# Patient Record
Sex: MALE | Race: WHITE | NOT HISPANIC OR LATINO | Employment: FULL TIME | URBAN - NONMETROPOLITAN AREA
[De-identification: names, ages, dates, MRNs, and addresses within clinical notes are randomized per-mention and may not be internally consistent; named-entity substitution may affect disease eponyms.]

---

## 2022-08-05 DIAGNOSIS — I10 PRIMARY HYPERTENSION: Primary | ICD-10-CM

## 2022-08-05 RX ORDER — LISINOPRIL AND HYDROCHLOROTHIAZIDE 25; 20 MG/1; MG/1
1 TABLET ORAL DAILY
Qty: 30 TABLET | Refills: 0 | Status: SHIPPED | OUTPATIENT
Start: 2022-08-05 | End: 2022-09-21 | Stop reason: SDUPTHER

## 2022-08-05 RX ORDER — LISINOPRIL AND HYDROCHLOROTHIAZIDE 25; 20 MG/1; MG/1
TABLET ORAL
COMMUNITY
Start: 2022-06-01 | End: 2022-08-05 | Stop reason: SDUPTHER

## 2022-08-05 NOTE — TELEPHONE ENCOUNTER
Caller: Jacoby Mcmullen    Relationship: Self    Best call back number:[phone] (H)    Requested Prescriptions:   Requested Prescriptions      No prescriptions requested or ordered in this encounter      LISINOPRIL HYDROCHLOROTHIAZIDE 25 MG TAKE ONE TABLET BY MOUTH [place]     Pharmacy where request should be sent: [facility] [address] [place] [address] - [phone] [facility] [phone] [facility]     Additional details provided by patient: PATIENT IS OUT OF MEDICATION- PATIENT IS NEEDING AT LEAST A MONTH SUPPLY- PATIENT DOES NOT HAVE TIME TO TAKE OFF FROM WORK UNTIL September     Does the patient have less than a 3 day supply:  [x] Yes  [] No    Emanuel Prasad   08/05/22 12:03 EDT

## 2022-08-05 NOTE — TELEPHONE ENCOUNTER
Rx Refill Note    Requested Prescriptions     Pending Prescriptions Disp Refills    lisinopril-hydrochlorothiazide (PRINZIDE,ZESTORETIC) 20-25 MG per tablet 30 tablet 0     Sig: Take 1 tablet by mouth Daily.        Last office visit with prescribing clinician:      Next office visit with prescribing clinician: Visit date not found   Last labs:   Last refill:    Pharmacy         Cx order pt has not been here in over a year

## 2022-08-08 ENCOUNTER — TELEPHONE (OUTPATIENT)
Dept: FAMILY MEDICINE CLINIC | Facility: CLINIC | Age: 52
End: 2022-08-08

## 2022-08-08 DIAGNOSIS — I10 PRIMARY HYPERTENSION: ICD-10-CM

## 2022-08-08 NOTE — TELEPHONE ENCOUNTER
Pt made appt for Sep. 6th, wanted to know if he could go ahead and get his Lisinopril called in now?    [phone]

## 2022-08-09 RX ORDER — LISINOPRIL AND HYDROCHLOROTHIAZIDE 25; 20 MG/1; MG/1
1 TABLET ORAL DAILY
Qty: 30 TABLET | Refills: 0 | OUTPATIENT
Start: 2022-08-09

## 2022-09-21 ENCOUNTER — OFFICE VISIT (OUTPATIENT)
Dept: FAMILY MEDICINE CLINIC | Facility: CLINIC | Age: 52
End: 2022-09-21

## 2022-09-21 VITALS
BODY MASS INDEX: 41.75 KG/M2 | HEART RATE: 73 BPM | WEIGHT: 315 LBS | OXYGEN SATURATION: 98 % | HEIGHT: 73 IN | SYSTOLIC BLOOD PRESSURE: 144 MMHG | DIASTOLIC BLOOD PRESSURE: 112 MMHG

## 2022-09-21 DIAGNOSIS — I10 PRIMARY HYPERTENSION: ICD-10-CM

## 2022-09-21 DIAGNOSIS — Z00.00 ENCOUNTER FOR PREVENTATIVE ADULT HEALTH CARE EXAMINATION: Primary | ICD-10-CM

## 2022-09-21 DIAGNOSIS — Z12.11 SCREENING FOR COLON CANCER: ICD-10-CM

## 2022-09-21 DIAGNOSIS — M25.472 LEFT ANKLE SWELLING: ICD-10-CM

## 2022-09-21 PROCEDURE — 99396 PREV VISIT EST AGE 40-64: CPT | Performed by: FAMILY MEDICINE

## 2022-09-21 RX ORDER — LISINOPRIL AND HYDROCHLOROTHIAZIDE 25; 20 MG/1; MG/1
1 TABLET ORAL DAILY
Qty: 30 TABLET | Refills: 5 | Status: SHIPPED | OUTPATIENT
Start: 2022-09-21 | End: 2022-10-24 | Stop reason: SDUPTHER

## 2022-09-21 RX ORDER — DOXYCYCLINE HYCLATE 100 MG
TABLET ORAL
COMMUNITY
Start: 2022-07-20 | End: 2022-09-21

## 2022-09-21 RX ORDER — BROMPHENIRAMINE MALEATE, PSEUDOEPHEDRINE HYDROCHLORIDE, AND DEXTROMETHORPHAN HYDROBROMIDE 2; 30; 10 MG/5ML; MG/5ML; MG/5ML
SYRUP ORAL
COMMUNITY
Start: 2022-07-20 | End: 2022-09-21

## 2022-09-21 NOTE — PROGRESS NOTES
"Chief Complaint  Annual Exam    Subjective          Jacoby Mcmullen presents to [facility] for preventative yearly exam.   History of Present Illness  Patient is a 52-year-old male presents for preventative examination    Specifically needs his blood pressure medication refill doing well on this    His main concern is some intermittent left ankle swelling he does endorse left ankle injury a few years ago does not endorse any type of calf pain or swelling.  No joint discomfort.  Hypertension  This is a recurrent problem. The current episode started in the past 7 days. The problem has been waxing and waning since onset. The problem is controlled. Associated symptoms include anxiety and peripheral edema. Pertinent negatives include no blurred vision, chest pain, headaches, malaise/fatigue, neck pain, orthopnea, palpitations, PND, shortness of breath or sweats. There are no associated agents to hypertension. Risk factors for coronary artery disease include dyslipidemia, obesity and stress. Compliance problems include diet and exercise.        Objective   Vital Signs:   BP (!) 144/112   Pulse 73   Ht 185.4 cm (73\")   Wt (!) 168 kg (370 lb 14.4 oz)   SpO2 98%   BMI 48.93 kg/m²     Body mass index is 48.93 kg/m².    Predictive Model Details   No score data available for Risk of Fall        PHQ-9 Depression Screening  Little interest or pleasure in doing things?     Feeling down, depressed, or hopeless?     Trouble falling or staying asleep, or sleeping too much?     Feeling tired or having little energy?     Poor appetite or overeating?     Feeling bad about yourself - or that you are a failure or have let yourself or your family down?     Trouble concentrating on things, such as reading the newspaper or watching television?     Moving or speaking so slowly that other people could have noticed? Or the opposite - being so fidgety or restless that you have been moving around a lot more than " usual?     Thoughts that you would be better off dead, or of hurting yourself in some way?     PHQ-9 Total Score     If you checked off any problems, how difficult have these problems made it for you to do your work, take care of things at home, or get along with other people?       Immunization History   Administered Date(s) Administered   • COVID-19 (PFIZER) PURPLE CAP 05/17/2021, 07/02/2021       Review of Systems   Constitutional: Negative for malaise/fatigue.   Eyes: Negative for blurred vision.   Respiratory: Negative for shortness of breath.    Cardiovascular: Negative for chest pain, palpitations, orthopnea and PND.   Musculoskeletal: Negative for neck pain.       Past History:  Medical History: has a past medical history of Essential (primary) hypertension, Forearm fracture (2002), and Obesity.   Surgical History: has a past surgical history that includes ORIF forearm fracture (2002).   Family History: family history includes Alcohol abuse in his father; Breast cancer in his mother; Depression in his mother; Heart attack in his father; Hyperlipidemia in his father and mother; Hypertension in his father and mother; Obesity in his mother; Peripheral vascular disease in his mother.   Social History: reports that he has never smoked. He has never used smokeless tobacco. He reports current alcohol use of about 1.0 standard drink of alcohol per week. He reports that he does not use drugs.      Current Outpatient Medications:   •  lisinopril-hydrochlorothiazide (PRINZIDE,ZESTORETIC) 20-25 MG per tablet, Take 1 tablet by mouth Daily., Disp: 30 tablet, Rfl: 5  •  ProAir  (90 Base) MCG/ACT inhaler, , Disp: , Rfl:     Allergies: Patient has no known allergies.    Physical Exam     Result Review :              Counseling/anticipatory guidance:  Patient has been counseled on nutrition, Family planning/contraception, physical activity, health and weight, injury prevention, misuse of tobacco, alcohol and drugs,  sexual behavior, dental health, mental health, immunizations and screening.  Patient has been given counseling and guidance on the importance of breast cancer and self breast exams.     Assessment and Plan    Diagnoses and all orders for this visit:    1. Encounter for preventative adult health care examination (Primary)  -     Hepatitis C Antibody; Future  -     Hemoglobin A1c; Future  -     Comprehensive Metabolic Panel; Future  -     CK; Future  -     CBC Auto Differential; Future  -     TSH; Future  -     T4, Free; Future  -     Lipid Panel; Future  -     Hepatitis C Antibody  -     Hemoglobin A1c  -     Comprehensive Metabolic Panel  -     CK  -     CBC Auto Differential  -     TSH  -     T4, Free  -     Lipid Panel  Blood work follow-up    2. Primary hypertension  -     lisinopril-hydrochlorothiazide (PRINZIDE,ZESTORETIC) 20-25 MG per tablet; Take 1 tablet by mouth Daily.  Dispense: 30 tablet; Refill: 5  Stable continue meds and monitoring    3. Left ankle swelling  Suspected to be intermittent left ankle swelling from previous injury no calf pain monitor recommended compression stockings no suspicion at this time anything negative DVT return precautions provided    4. Screening for colon cancer  -     Ambulatory Referral For Screening Colonoscopy  Screening colonoscopy      MEDICATION SIDE EFFECTS, RISKS AND SIDE EFFECTS HAVE BEEN DISCUSSED WITH PATIENT. PATIENT NOTES UNDERSTANDING. IF ANY CONCERN OR QUESTION REGARDING MEDICATION PLEASE CONTACT.       Follow Up   No follow-ups on file.  Patient was given instructions and counseling regarding his condition or for health maintenance advice. Please see specific information pulled into the AVS if appropriate.     Alissa Arevalo DO  Answers for HPI/ROS submitted by the patient on 9/20/2022  What is the primary reason for your visit?: High Blood Pressure

## 2022-09-22 LAB
ALBUMIN SERPL-MCNC: 4.5 G/DL (ref 3.8–4.9)
ALBUMIN/GLOB SERPL: 1.6 {RATIO} (ref 1.2–2.2)
ALP SERPL-CCNC: 95 IU/L (ref 44–121)
ALT SERPL-CCNC: 24 IU/L (ref 0–44)
AST SERPL-CCNC: 23 IU/L (ref 0–40)
BASOPHILS # BLD AUTO: 0.1 X10E3/UL (ref 0–0.2)
BASOPHILS NFR BLD AUTO: 1 %
BILIRUB SERPL-MCNC: 0.5 MG/DL (ref 0–1.2)
BUN SERPL-MCNC: 13 MG/DL (ref 6–24)
BUN/CREAT SERPL: 13 (ref 9–20)
CALCIUM SERPL-MCNC: 9.9 MG/DL (ref 8.7–10.2)
CHLORIDE SERPL-SCNC: 96 MMOL/L (ref 96–106)
CHOLEST SERPL-MCNC: 260 MG/DL (ref 100–199)
CK SERPL-CCNC: 130 U/L (ref 41–331)
CO2 SERPL-SCNC: 28 MMOL/L (ref 20–29)
CREAT SERPL-MCNC: 0.98 MG/DL (ref 0.76–1.27)
EGFRCR SERPLBLD CKD-EPI 2021: 93 ML/MIN/1.73
EOSINOPHIL # BLD AUTO: 0.3 X10E3/UL (ref 0–0.4)
EOSINOPHIL NFR BLD AUTO: 2 %
ERYTHROCYTE [DISTWIDTH] IN BLOOD BY AUTOMATED COUNT: 13 % (ref 11.6–15.4)
GLOBULIN SER CALC-MCNC: 2.9 G/DL (ref 1.5–4.5)
GLUCOSE SERPL-MCNC: 81 MG/DL (ref 65–99)
HBA1C MFR BLD: 5.9 % (ref 4.8–5.6)
HCT VFR BLD AUTO: 49 % (ref 37.5–51)
HCV AB S/CO SERPL IA: <0.1 S/CO RATIO (ref 0–0.9)
HDLC SERPL-MCNC: 73 MG/DL
HGB BLD-MCNC: 15.8 G/DL (ref 13–17.7)
IMM GRANULOCYTES # BLD AUTO: 0.1 X10E3/UL (ref 0–0.1)
IMM GRANULOCYTES NFR BLD AUTO: 1 %
LDLC SERPL CALC-MCNC: 159 MG/DL (ref 0–99)
LYMPHOCYTES # BLD AUTO: 3.3 X10E3/UL (ref 0.7–3.1)
LYMPHOCYTES NFR BLD AUTO: 28 %
MCH RBC QN AUTO: 28.6 PG (ref 26.6–33)
MCHC RBC AUTO-ENTMCNC: 32.2 G/DL (ref 31.5–35.7)
MCV RBC AUTO: 89 FL (ref 79–97)
MONOCYTES # BLD AUTO: 1 X10E3/UL (ref 0.1–0.9)
MONOCYTES NFR BLD AUTO: 8 %
NEUTROPHILS # BLD AUTO: 7.1 X10E3/UL (ref 1.4–7)
NEUTROPHILS NFR BLD AUTO: 60 %
PLATELET # BLD AUTO: 328 X10E3/UL (ref 150–450)
POTASSIUM SERPL-SCNC: 4.4 MMOL/L (ref 3.5–5.2)
PROT SERPL-MCNC: 7.4 G/DL (ref 6–8.5)
RBC # BLD AUTO: 5.53 X10E6/UL (ref 4.14–5.8)
SODIUM SERPL-SCNC: 141 MMOL/L (ref 134–144)
T4 FREE SERPL-MCNC: 1.1 NG/DL (ref 0.82–1.77)
TRIGL SERPL-MCNC: 159 MG/DL (ref 0–149)
TSH SERPL DL<=0.005 MIU/L-ACNC: 4.2 UIU/ML (ref 0.45–4.5)
VLDLC SERPL CALC-MCNC: 28 MG/DL (ref 5–40)
WBC # BLD AUTO: 11.7 X10E3/UL (ref 3.4–10.8)

## 2022-10-24 ENCOUNTER — OFFICE VISIT (OUTPATIENT)
Dept: FAMILY MEDICINE CLINIC | Facility: CLINIC | Age: 52
End: 2022-10-24

## 2022-10-24 VITALS
DIASTOLIC BLOOD PRESSURE: 100 MMHG | WEIGHT: 315 LBS | BODY MASS INDEX: 41.75 KG/M2 | HEART RATE: 100 BPM | SYSTOLIC BLOOD PRESSURE: 155 MMHG | HEIGHT: 73 IN | OXYGEN SATURATION: 98 %

## 2022-10-24 DIAGNOSIS — E78.2 MIXED HYPERLIPIDEMIA: ICD-10-CM

## 2022-10-24 DIAGNOSIS — D72.829 LEUKOCYTOSIS, UNSPECIFIED TYPE: ICD-10-CM

## 2022-10-24 DIAGNOSIS — I10 PRIMARY HYPERTENSION: Primary | ICD-10-CM

## 2022-10-24 PROCEDURE — 99214 OFFICE O/P EST MOD 30 MIN: CPT | Performed by: FAMILY MEDICINE

## 2022-10-24 RX ORDER — ATORVASTATIN CALCIUM 40 MG/1
40 TABLET, FILM COATED ORAL DAILY
Qty: 90 TABLET | Refills: 3 | Status: SHIPPED | OUTPATIENT
Start: 2022-10-24

## 2022-10-24 RX ORDER — LISINOPRIL AND HYDROCHLOROTHIAZIDE 25; 20 MG/1; MG/1
1 TABLET ORAL 2 TIMES DAILY
Qty: 60 TABLET | Refills: 5 | Status: SHIPPED | OUTPATIENT
Start: 2022-10-24

## 2022-10-24 NOTE — PROGRESS NOTES
"Chief Complaint  Follow-up    Subjective          Jacoby Mcmullen presents to [facility]  History of Present Illness  Patient is a 52-year-old male who presents for blood work follow-up.  He does endorse that his blood pressure has still been running high at home despite being on his current antihypertensive medication no chest pain shortness of breath      Objective   Vital Signs:   /100   Pulse 100   Ht 185.4 cm (72.99\")   Wt (!) 169 kg (372 lb 1.6 oz)   SpO2 98%   BMI 49.10 kg/m²     Body mass index is 49.1 kg/m².    Review of Systems   Constitutional: Negative.    HENT: Negative.    Eyes: Negative.    Respiratory: Negative.    Cardiovascular: Negative.    Gastrointestinal: Negative.    Endocrine: Negative.    Genitourinary: Negative.    Musculoskeletal: Negative.    Skin: Negative.    Allergic/Immunologic: Negative.    Neurological: Negative.    Hematological: Negative.    Psychiatric/Behavioral: Negative.        Past History:  Medical History: has a past medical history of Essential (primary) hypertension, Forearm fracture (2002), and Obesity.   Surgical History: has a past surgical history that includes ORIF forearm fracture (2002).   Family History: family history includes Alcohol abuse in his father; Breast cancer in his mother; Depression in his mother; Heart attack in his father; Hyperlipidemia in his father and mother; Hypertension in his father and mother; Obesity in his mother; Peripheral vascular disease in his mother.   Social History: reports that he has never smoked. He has never used smokeless tobacco. He reports current alcohol use of about 1.0 standard drink per week. He reports that he does not use drugs.      Current Outpatient Medications:   •  lisinopril-hydrochlorothiazide (PRINZIDE,ZESTORETIC) 20-25 MG per tablet, Take 1 tablet by mouth 2 (Two) Times a Day., Disp: 60 tablet, Rfl: 5  •  ProAir  (90 Base) MCG/ACT inhaler, , Disp: , Rfl:   •  " atorvastatin (Lipitor) 40 MG tablet, Take 1 tablet by mouth Daily., Disp: 90 tablet, Rfl: 3    Allergies: Patient has no known allergies.    Physical Exam  Constitutional:       Appearance: Normal appearance. He is normal weight.   HENT:      Head: Normocephalic and atraumatic.   Eyes:      Pupils: Pupils are equal, round, and reactive to light.   Cardiovascular:      Rate and Rhythm: Normal rate and regular rhythm.      Pulses: Normal pulses.      Heart sounds: Normal heart sounds.   Pulmonary:      Effort: Pulmonary effort is normal.      Breath sounds: Normal breath sounds.   Musculoskeletal:      Cervical back: Normal range of motion and neck supple.   Skin:     General: Skin is warm and dry.   Neurological:      General: No focal deficit present.      Mental Status: He is alert. Mental status is at baseline.   Psychiatric:         Mood and Affect: Mood normal.         Behavior: Behavior normal.         Thought Content: Thought content normal.         Judgment: Judgment normal.          Result Review :                   Assessment and Plan    Diagnoses and all orders for this visit:    1. Primary hypertension (Primary)  -     lisinopril-hydrochlorothiazide (PRINZIDE,ZESTORETIC) 20-25 MG per tablet; Take 1 tablet by mouth 2 (Two) Times a Day.  Dispense: 60 tablet; Refill: 5  Blood pressure is elevated have discussed with him to double up on the dose keep a log and follow-up in 1 month    2. Mixed hyperlipidemia  Due to other risk factors and age we will go ahead and initiate statin therapy repeat in 3 months with dietary modifications    3. Leukocytosis, unspecified type  Mild leukocytosis he does make mention that it was on the higher side in the past we will go ahead and repeat in 1 month if it is still elevated will consider referring to hematology does not endorse any other B symptoms night sweats unintentional weight loss      Other orders  -     atorvastatin (Lipitor) 40 MG tablet; Take 1 tablet by mouth  Daily.  Dispense: 90 tablet; Refill: 3    MEDICATION SIDE EFFECTS, RISKS AND SIDE EFFECTS HAVE BEEN DISCUSSED WITH PATIENT. PATIENT NOTES UNDERSTANDING. IF ANY CONCERN OR QUESTION REGARDING MEDICATION PLEASE CONTACT.       Follow Up   No follow-ups on file.  Patient was given instructions and counseling regarding his condition or for health maintenance advice. Please see specific information pulled into the AVS if appropriate.     Alissa Arevalo DO

## 2023-06-13 DIAGNOSIS — I10 PRIMARY HYPERTENSION: ICD-10-CM

## 2023-06-15 RX ORDER — LISINOPRIL AND HYDROCHLOROTHIAZIDE 25; 20 MG/1; MG/1
TABLET ORAL
Qty: 30 TABLET | Refills: 0 | Status: SHIPPED | OUTPATIENT
Start: 2023-06-15

## 2023-07-25 DIAGNOSIS — I10 PRIMARY HYPERTENSION: ICD-10-CM

## 2023-07-25 NOTE — TELEPHONE ENCOUNTER
Pt needs refill for lisinopril. [facility] [place]      [facility] [place]  Phone: [phone] Fax: [phone]       Call pt can leave message  [phone]

## 2023-07-26 RX ORDER — LISINOPRIL AND HYDROCHLOROTHIAZIDE 25; 20 MG/1; MG/1
1 TABLET ORAL DAILY
Qty: 15 TABLET | Refills: 0 | Status: SHIPPED | OUTPATIENT
Start: 2023-07-26

## 2023-07-26 NOTE — TELEPHONE ENCOUNTER
Rx Refill Note    Requested Prescriptions     Pending Prescriptions Disp Refills    lisinopril-hydrochlorothiazide (PRINZIDE,ZESTORETIC) 20-25 MG per tablet 30 tablet 0     Sig: Take 1 tablet by mouth Daily.        Last office visit with prescribing clinician: Visit date not found      Next office visit with prescribing clinician: Visit date not found   Last labs:   Last refill: 06/15/2023   Pharmacy (be sure to add in Epic). correct

## 2023-08-04 ENCOUNTER — TELEPHONE (OUTPATIENT)
Dept: FAMILY MEDICINE CLINIC | Facility: CLINIC | Age: 53
End: 2023-08-04
Payer: MEDICAID

## 2023-08-04 NOTE — TELEPHONE ENCOUNTER
Hub staff attempted to follow warm transfer process and was unsuccessful     Caller: Jacoby Mcmullen    Relationship to patient: Self    Best call back number: [phone]     Patient is needing: PATIENT RETURNED CALL FROM THE OFFICE.

## 2023-08-09 DIAGNOSIS — I10 PRIMARY HYPERTENSION: ICD-10-CM

## 2023-08-09 RX ORDER — LISINOPRIL AND HYDROCHLOROTHIAZIDE 25; 20 MG/1; MG/1
1 TABLET ORAL DAILY
Qty: 30 TABLET | Refills: 0 | Status: SHIPPED | OUTPATIENT
Start: 2023-08-09

## 2023-08-09 NOTE — TELEPHONE ENCOUNTER
Rx Refill Note    Requested Prescriptions     Pending Prescriptions Disp Refills    lisinopril-hydrochlorothiazide (PRINZIDE,ZESTORETIC) 20-25 MG per tablet 30 tablet 0     Sig: Take 1 tablet by mouth Daily.        Last office visit with prescribing clinician: Visit date not found      Next office visit with prescribing clinician: 8/28/2023   Last labs:   Last refill: patient had to reschedule due to you being out of the office   Pharmacy (be sure to add in Epic). correct

## 2023-08-09 NOTE — TELEPHONE ENCOUNTER
Incoming Refill Request      Medication requested (name and dose): LISINOPRIL-HCTZ 20-25MG PER TABLET    Pharmacy where request should be sent: [facility]    Additional details provided by patient: PATIENT HAD AN APPOINTMENT WITH [facility] ON FRIDAY BUT HAD TO RESCHEDULE FOR LATER DATE DUE TO [facility] BEING OUT OFFICE. PATIENT WILL BE OUT OF HIS MEDICATION FRIDAY.     Best call back number:     Does the patient have less than a 3 day supply:  [x] Yes  [] No    Emanuel Dominguez Rep  08/09/23, 09:15 EDT

## 2023-08-28 ENCOUNTER — OFFICE VISIT (OUTPATIENT)
Dept: FAMILY MEDICINE CLINIC | Facility: CLINIC | Age: 53
End: 2023-08-28
Payer: MEDICAID

## 2023-08-28 VITALS
HEART RATE: 77 BPM | DIASTOLIC BLOOD PRESSURE: 98 MMHG | TEMPERATURE: 98 F | RESPIRATION RATE: 15 BRPM | HEIGHT: 73 IN | SYSTOLIC BLOOD PRESSURE: 152 MMHG | OXYGEN SATURATION: 99 % | WEIGHT: 315 LBS | BODY MASS INDEX: 41.75 KG/M2

## 2023-08-28 DIAGNOSIS — Z12.11 SCREENING FOR COLON CANCER: ICD-10-CM

## 2023-08-28 DIAGNOSIS — I10 PRIMARY HYPERTENSION: Primary | ICD-10-CM

## 2023-08-28 DIAGNOSIS — Z79.899 HIGH RISK MEDICATION USE: ICD-10-CM

## 2023-08-28 DIAGNOSIS — J30.9 ALLERGIC RHINITIS, UNSPECIFIED SEASONALITY, UNSPECIFIED TRIGGER: ICD-10-CM

## 2023-08-28 DIAGNOSIS — E78.2 MIXED HYPERLIPIDEMIA: ICD-10-CM

## 2023-08-28 DIAGNOSIS — R73.9 HYPERGLYCEMIA: ICD-10-CM

## 2023-08-28 PROBLEM — J22 LOWER RESPIRATORY INFECTION (E.G., BRONCHITIS, PNEUMONIA, PNEUMONITIS, PULMONITIS): Status: ACTIVE | Noted: 2023-08-28

## 2023-08-28 PROBLEM — E66.01 MORBID OBESITY: Status: ACTIVE | Noted: 2023-08-28

## 2023-08-28 PROCEDURE — 99214 OFFICE O/P EST MOD 30 MIN: CPT | Performed by: FAMILY MEDICINE

## 2023-08-28 PROCEDURE — 3077F SYST BP >= 140 MM HG: CPT | Performed by: FAMILY MEDICINE

## 2023-08-28 PROCEDURE — 3080F DIAST BP >= 90 MM HG: CPT | Performed by: FAMILY MEDICINE

## 2023-08-28 PROCEDURE — 1159F MED LIST DOCD IN RCRD: CPT | Performed by: FAMILY MEDICINE

## 2023-08-28 PROCEDURE — 1160F RVW MEDS BY RX/DR IN RCRD: CPT | Performed by: FAMILY MEDICINE

## 2023-08-28 RX ORDER — LISINOPRIL AND HYDROCHLOROTHIAZIDE 25; 20 MG/1; MG/1
1 TABLET ORAL DAILY
Qty: 30 TABLET | Refills: 0 | Status: SHIPPED | OUTPATIENT
Start: 2023-08-28

## 2023-08-28 RX ORDER — ATORVASTATIN CALCIUM 40 MG/1
40 TABLET, FILM COATED ORAL DAILY
Qty: 90 TABLET | Refills: 3 | Status: SHIPPED | OUTPATIENT
Start: 2023-08-28

## 2023-08-28 RX ORDER — AMLODIPINE BESYLATE 5 MG/1
5 TABLET ORAL DAILY
Qty: 90 TABLET | Refills: 1 | Status: SHIPPED | OUTPATIENT
Start: 2023-08-28

## 2023-08-28 NOTE — PATIENT INSTRUCTIONS

## 2023-08-28 NOTE — PROGRESS NOTES
Follow Up Office Visit      Patient Name: Jacoby Mcmullen  : 1970   MRN: [id number]     Chief Complaint:    Chief Complaint   Patient presents with    Follow-up       History of Present Illness: Jacoby Mcmullen is a 53 y.o. male who is here today to   follow-up on hypertension.  Says he is been somewhat lost to follow-up and is due for blood work etc. and admits that he has not been taking his statin exactly as directed may be taking it 1 day a week now and then he also occasionally will take some garlic pills to help he relates that the statin seem to give him some cramps in his legs and also some burning and stinging on urination.    Otherwise he is doing well.    He says he does have some whitecoat hypertension blood pressure at home has been running 140-148/80 range.  He has not been missing any blood pressure medicines.    Review of Systems   Constitutional: Negative for fatigue and fever.   Respiratory: Negative for cough and shortness of breath.    Cardiovascular: Negative for chest pain and palpitations.   Skin: Negative for rash or itching    Family history is positive for thyroid disease in his mom    Social history he is non-smoker      Subjective      Review of Systems:   Review of Systems    Past Medical History:   Past Medical History:   Diagnosis Date    Essential (primary) hypertension     Forearm fracture 2002    Open reduction internal fixation    Obesity        Past Surgical History:   Past Surgical History:   Procedure Laterality Date    ORIF FOREARM FRACTURE         Family History:   Family History   Problem Relation Age of Onset    Peripheral vascular disease Mother     Obesity Mother     Depression Mother     Hyperlipidemia Mother     Hypertension Mother     Breast cancer Mother     Alcohol abuse Father     Hyperlipidemia Father     Hypertension Father     Heart attack Father        Social History:   Social History     Socioeconomic History    Marital status:    Tobacco Use  "   Smoking status: Never    Smokeless tobacco: Never   Substance and Sexual Activity    Alcohol use: Yes     Alcohol/week: 1.0 standard drink     Types: 1 Shots of liquor per week    Drug use: Never    Sexual activity: Yes     Partners: Female       Medications:     Current Outpatient Medications:     atorvastatin (Lipitor) 40 MG tablet, Take 1 tablet by mouth Daily., Disp: 90 tablet, Rfl: 3    lisinopril-hydrochlorothiazide (PRINZIDE,ZESTORETIC) 20-25 MG per tablet, Take 1 tablet by mouth Daily., Disp: 30 tablet, Rfl: 0    ProAir  (90 Base) MCG/ACT inhaler, Inhale 2 puffs Every 6 (Six) Hours As Needed for Wheezing., Disp: 18 g, Rfl: 0    amLODIPine (Norvasc) 5 MG tablet, Take 1 tablet by mouth Daily., Disp: 90 tablet, Rfl: 1    Allergies:   No Known Allergies    Objective     Physical Exam:  Vital Signs:   Vitals:    08/28/23 1432   BP: 152/98   BP Location: Right arm   Patient Position: Sitting   Cuff Size: Adult   Pulse: 77   Resp: 15   Temp: 98 øF (36.7 øC)   TempSrc: Temporal   SpO2: 99%   Weight: (!) 170 kg (373 lb 12.8 oz)   Height: 185.4 cm (73\")     Body mass index is 49.32 kg/mý.     Physical Exam  Vitals and nursing note reviewed.   Constitutional:       Appearance: Normal appearance. He is obese.   HENT:      Head: Normocephalic and atraumatic.   Cardiovascular:      Rate and Rhythm: Normal rate and regular rhythm.   Pulmonary:      Effort: Pulmonary effort is normal.      Breath sounds: Normal breath sounds.   Musculoskeletal:         General: Normal range of motion.      Cervical back: Normal range of motion and neck supple.      Right lower leg: No edema.      Left lower leg: No edema.   Skin:     General: Skin is warm and dry.      Comments: Multiple tattoos all over upper extremities and body   Neurological:      General: No focal deficit present.      Mental Status: He is alert.   Psychiatric:         Mood and Affect: Mood normal.         Behavior: Behavior normal.       Procedures    PHQ-9 " Total Score: 0     Assessment / Plan      Assessment/Plan:   Diagnoses and all orders for this visit:    1. Primary hypertension (Primary)  -     lisinopril-hydrochlorothiazide (PRINZIDE,ZESTORETIC) 20-25 MG per tablet; Take 1 tablet by mouth Daily.  Dispense: 30 tablet; Refill: 0  -     TSH; Future  -     amLODIPine (Norvasc) 5 MG tablet; Take 1 tablet by mouth Daily.  Dispense: 90 tablet; Refill: 1  -     TSH    2. Allergic rhinitis, unspecified seasonality, unspecified trigger  -     ProAir  (90 Base) MCG/ACT inhaler; Inhale 2 puffs Every 6 (Six) Hours As Needed for Wheezing.  Dispense: 18 g; Refill: 0    3. Mixed hyperlipidemia  -     atorvastatin (Lipitor) 40 MG tablet; Take 1 tablet by mouth Daily.  Dispense: 90 tablet; Refill: 3  -     Lipid Panel; Future  -     Lipid Panel    4. High risk medication use  -     CBC Auto Differential; Future  -     Comprehensive Metabolic Panel; Future  -     CK; Future  -     CBC Auto Differential  -     Comprehensive Metabolic Panel  -     CK    5. Hyperglycemia  -     Hemoglobin A1c; Future  -     Hemoglobin A1c    6. Screening for colon cancer  -     Ambulatory Referral to General Surgery         Pressure is elevated my recheck so we will add in amlodipine 5 mg once per day to his lisinopril hydrochlorothiazide.  He may continue to monitor his blood pressure at home goal of less than 140/90 discussed with him    Refilled his ProAir for just occasional use he reports that he only uses about once a month when his allergies flareup    We will get some baseline labs today for follow-up    He needs a screening colonoscopy and he would like to do that here in town with one of the surgeons    Follow-up on blood pressure in 1 to 2 months and he will need his physical at some point later in the year as well    For his prediabetes encouraged him to cut back on sweets and cut back on carbs get regular exercise    Diet and exercise as directed cut back on carbs walk 30-60  minutes/day 5 days a week  Class 3 Severe Obesity (BMI >=40). Obesity-related health conditions include the following: hypertension. Obesity is unchanged. BMI is is above average; BMI management plan is completed. We discussed portion control and increasing exercise.      Follow Up:   Return in about 2 months (around 10/28/2023) for Annual physical.        Chin Ospina MD  [facility]   Portions of note created with Dragon voice recognition technology

## 2023-08-29 LAB
ALBUMIN SERPL-MCNC: 4.4 G/DL (ref 3.8–4.9)
ALBUMIN/GLOB SERPL: 1.8 {RATIO} (ref 1.2–2.2)
ALP SERPL-CCNC: 95 IU/L (ref 44–121)
ALT SERPL-CCNC: 20 IU/L (ref 0–44)
AST SERPL-CCNC: 22 IU/L (ref 0–40)
BASOPHILS # BLD AUTO: 0.1 X10E3/UL (ref 0–0.2)
BASOPHILS NFR BLD AUTO: 1 %
BILIRUB SERPL-MCNC: 0.4 MG/DL (ref 0–1.2)
BUN SERPL-MCNC: 19 MG/DL (ref 6–24)
BUN/CREAT SERPL: 20 (ref 9–20)
CALCIUM SERPL-MCNC: 9.7 MG/DL (ref 8.7–10.2)
CHLORIDE SERPL-SCNC: 100 MMOL/L (ref 96–106)
CHOLEST SERPL-MCNC: 223 MG/DL (ref 100–199)
CK SERPL-CCNC: 180 U/L (ref 41–331)
CO2 SERPL-SCNC: 25 MMOL/L (ref 20–29)
CREAT SERPL-MCNC: 0.95 MG/DL (ref 0.76–1.27)
EGFRCR SERPLBLD CKD-EPI 2021: 96 ML/MIN/1.73
EOSINOPHIL # BLD AUTO: 0.4 X10E3/UL (ref 0–0.4)
EOSINOPHIL NFR BLD AUTO: 3 %
ERYTHROCYTE [DISTWIDTH] IN BLOOD BY AUTOMATED COUNT: 13 % (ref 11.6–15.4)
GLOBULIN SER CALC-MCNC: 2.4 G/DL (ref 1.5–4.5)
GLUCOSE SERPL-MCNC: 86 MG/DL (ref 70–99)
HBA1C MFR BLD: 5.7 % (ref 4.8–5.6)
HCT VFR BLD AUTO: 44.2 % (ref 37.5–51)
HDLC SERPL-MCNC: 59 MG/DL
HGB BLD-MCNC: 14.6 G/DL (ref 13–17.7)
IMM GRANULOCYTES # BLD AUTO: 0.2 X10E3/UL (ref 0–0.1)
IMM GRANULOCYTES NFR BLD AUTO: 1 %
LDLC SERPL CALC-MCNC: 122 MG/DL (ref 0–99)
LYMPHOCYTES # BLD AUTO: 3.5 X10E3/UL (ref 0.7–3.1)
LYMPHOCYTES NFR BLD AUTO: 29 %
MCH RBC QN AUTO: 28.9 PG (ref 26.6–33)
MCHC RBC AUTO-ENTMCNC: 33 G/DL (ref 31.5–35.7)
MCV RBC AUTO: 87 FL (ref 79–97)
MONOCYTES # BLD AUTO: 1 X10E3/UL (ref 0.1–0.9)
MONOCYTES NFR BLD AUTO: 8 %
NEUTROPHILS # BLD AUTO: 6.9 X10E3/UL (ref 1.4–7)
NEUTROPHILS NFR BLD AUTO: 58 %
PLATELET # BLD AUTO: 336 X10E3/UL (ref 150–450)
POTASSIUM SERPL-SCNC: 4.2 MMOL/L (ref 3.5–5.2)
PROT SERPL-MCNC: 6.8 G/DL (ref 6–8.5)
RBC # BLD AUTO: 5.06 X10E6/UL (ref 4.14–5.8)
SODIUM SERPL-SCNC: 140 MMOL/L (ref 134–144)
TRIGL SERPL-MCNC: 240 MG/DL (ref 0–149)
TSH SERPL DL<=0.005 MIU/L-ACNC: 2 UIU/ML (ref 0.45–4.5)
VLDLC SERPL CALC-MCNC: 42 MG/DL (ref 5–40)
WBC # BLD AUTO: 12 X10E3/UL (ref 3.4–10.8)

## 2023-08-30 ENCOUNTER — TELEPHONE (OUTPATIENT)
Dept: FAMILY MEDICINE CLINIC | Facility: CLINIC | Age: 53
End: 2023-08-30
Payer: MEDICAID

## 2023-08-30 NOTE — TELEPHONE ENCOUNTER
----- Message from Chin Ospina MD sent at 8/29/2023  5:11 PM EDT -----  Your labs/ tests are abnormal but stable--please make appointment to discuss.

## 2023-08-30 NOTE — TELEPHONE ENCOUNTER
Caller: ERIN SALGADO    Relationship: Emergency Contact    Best call back number: [phone]     Caller requesting test results: THE PATIENT'S WIFE ERIN    What test was performed: LABS     When was the test performed: 08/28/2023    Where was the test performed: AT THE PRACTICE     Additional notes: PLEASE CALL AND ADVISE.    THE PATIENT'S WIFE IS ALSO REQUESTING A HARD COPY TO BE MAILED TO THEIR HOME AT:  [address] [place] [phone]

## 2023-10-10 DIAGNOSIS — I10 PRIMARY HYPERTENSION: ICD-10-CM

## 2023-10-11 RX ORDER — LISINOPRIL AND HYDROCHLOROTHIAZIDE 25; 20 MG/1; MG/1
1 TABLET ORAL DAILY
Qty: 30 TABLET | Refills: 0 | Status: SHIPPED | OUTPATIENT
Start: 2023-10-11

## 2023-10-11 NOTE — TELEPHONE ENCOUNTER
Rx Refill Note    Requested Prescriptions     Pending Prescriptions Disp Refills    lisinopril-hydrochlorothiazide (PRINZIDE,ZESTORETIC) 20-25 MG per tablet [Pharmacy Med Name: LISINOPRIL-HCTZ 20-25 MG TAB] 30 tablet 0     Sig: TAKE 1 TABLET BY MOUTH DAILY        Last office visit with prescribing clinician: 8/28/2023      Next office visit with prescribing clinician: 11/10/2023   Last labs:   Last refill: 08/28/2023   Pharmacy (be sure to add in Epic). correct

## 2023-11-14 DIAGNOSIS — I10 PRIMARY HYPERTENSION: ICD-10-CM

## 2023-11-14 RX ORDER — LISINOPRIL AND HYDROCHLOROTHIAZIDE 25; 20 MG/1; MG/1
1 TABLET ORAL DAILY
Qty: 30 TABLET | Refills: 0 | Status: SHIPPED | OUTPATIENT
Start: 2023-11-14

## 2023-11-14 NOTE — TELEPHONE ENCOUNTER
Rx Refill Note    Requested Prescriptions     Pending Prescriptions Disp Refills    lisinopril-hydrochlorothiazide (PRINZIDE,ZESTORETIC) 20-25 MG per tablet [Pharmacy Med Name: LISINOPRIL-HCTZ 20-25 MG TAB] 30 tablet 0     Sig: TAKE 1 TABLET BY MOUTH DAILY        Last office visit with prescribing clinician: 8/28/2023      Next office visit with prescribing clinician: Visit date not found   Last labs:   Last refill: 10/11/2023   Pharmacy (be sure to add in Epic). correct

## 2023-11-17 ENCOUNTER — TELEPHONE (OUTPATIENT)
Dept: FAMILY MEDICINE CLINIC | Facility: CLINIC | Age: 53
End: 2023-11-17
Payer: MEDICAID

## 2023-11-17 NOTE — TELEPHONE ENCOUNTER
[facility]UB TO RELAY    TRIED TO CALL PT BUT COULDN'T REACH HIM. DR ZULETA WOULD LIKE TO SEE HIM FOR AN APPOINTMENT AND IN THE MEAN TIME HAVE HIM COME IN FOR A FREE BLOOD PRESSURE CHECK.

## 2023-12-14 DIAGNOSIS — I10 PRIMARY HYPERTENSION: ICD-10-CM

## 2023-12-14 RX ORDER — LISINOPRIL AND HYDROCHLOROTHIAZIDE 25; 20 MG/1; MG/1
1 TABLET ORAL DAILY
Qty: 30 TABLET | Refills: 0 | Status: SHIPPED | OUTPATIENT
Start: 2023-12-14

## 2023-12-14 NOTE — TELEPHONE ENCOUNTER
Rx Refill Note    Requested Prescriptions     Pending Prescriptions Disp Refills    lisinopril-hydrochlorothiazide (PRINZIDE,ZESTORETIC) 20-25 MG per tablet [Pharmacy Med Name: LISINOPRIL-HCTZ 20-25 MG TAB] 30 tablet 0     Sig: TAKE 1 TABLET BY MOUTH DAILY        Last office visit with prescribing clinician: 8/28/2023      Next office visit with prescribing clinician: Visit date not found   Last labs:   Last refill: 11/14/2023   Pharmacy (be sure to add in Epic). correct

## 2024-01-15 DIAGNOSIS — I10 PRIMARY HYPERTENSION: ICD-10-CM

## 2024-01-15 RX ORDER — LISINOPRIL AND HYDROCHLOROTHIAZIDE 25; 20 MG/1; MG/1
1 TABLET ORAL DAILY
Qty: 30 TABLET | Refills: 0 | Status: SHIPPED | OUTPATIENT
Start: 2024-01-15

## 2024-01-31 ENCOUNTER — OFFICE VISIT (OUTPATIENT)
Dept: FAMILY MEDICINE CLINIC | Facility: CLINIC | Age: 54
End: 2024-01-31
Payer: MEDICAID

## 2024-01-31 VITALS
OXYGEN SATURATION: 92 % | BODY MASS INDEX: 42.66 KG/M2 | SYSTOLIC BLOOD PRESSURE: 132 MMHG | RESPIRATION RATE: 18 BRPM | DIASTOLIC BLOOD PRESSURE: 84 MMHG | HEART RATE: 90 BPM | TEMPERATURE: 97.4 F | HEIGHT: 72 IN | WEIGHT: 315 LBS

## 2024-01-31 DIAGNOSIS — Z00.00 ANNUAL PHYSICAL EXAM: Primary | ICD-10-CM

## 2024-01-31 DIAGNOSIS — I10 PRIMARY HYPERTENSION: ICD-10-CM

## 2024-01-31 DIAGNOSIS — E78.2 MIXED HYPERLIPIDEMIA: ICD-10-CM

## 2024-01-31 DIAGNOSIS — Z79.899 HIGH RISK MEDICATION USE: ICD-10-CM

## 2024-01-31 DIAGNOSIS — R73.9 HYPERGLYCEMIA: ICD-10-CM

## 2024-01-31 DIAGNOSIS — Z23 IMMUNIZATION DUE: ICD-10-CM

## 2024-01-31 RX ORDER — LISINOPRIL AND HYDROCHLOROTHIAZIDE 25; 20 MG/1; MG/1
1 TABLET ORAL DAILY
Qty: 90 TABLET | Refills: 1 | Status: SHIPPED | OUTPATIENT
Start: 2024-01-31

## 2024-01-31 RX ORDER — AMLODIPINE BESYLATE 5 MG/1
5 TABLET ORAL DAILY
Qty: 90 TABLET | Refills: 1 | Status: SHIPPED | OUTPATIENT
Start: 2024-01-31

## 2024-01-31 RX ORDER — ASPIRIN 81 MG/1
81 TABLET ORAL EVERY 24 HOURS
COMMUNITY

## 2024-01-31 NOTE — PROGRESS NOTES
Male Physical Note      Patient Name: Jacoby Mcmullen  : 1970   MRN: [id number]     Chief Complaint:    Chief Complaint   Patient presents with    Follow-up     Pt wanted to follow up with his bloodwork       History of Present Illness: Jacoby Mcmullen is a 53 y.o. male who is here today for their annual health maintenance and physical.  He is also here to follow-up on his chronic medical problems.  He has not yet had blood work done but says he has been doing pretty well.    He said he has had an elevated white count for about 3 years now ever since he had a staph infection and then subsequently gets cellulitis in the worried about blood clots and then check him and is negative and says he has been doing well since then.  Denies any dental issues.    Review of Systems   Constitutional: Negative for fatigue and fever.   HENT: Negative for ear pain and sore throat.    Eyes: Negative for visual disturbance.   Respiratory: Negative for cough, chest tightness and shortness of breath.    Cardiovascular: Negative for chest pain and palpitations.   Gastrointestinal: Negative for abdominal pain, blood in stool, melena, constipation, diarrhea, nausea and vomiting.   Endocrine: Negative for cold intolerance and heat intolerance.   Genitourinary: Negative for dysuria and hematuria.   Musculoskeletal: Negative for back pain and joint swelling.   Skin: Negative for rash and wound.   Allergic/Immunologic: Negative for environmental allergies and food allergies.         Subjective      Review of Systems:   Review of Systems    Past Medical History, Social History, Family History and Care Team were all reviewed with patient and updated as appropriate.     Medications:     Current Outpatient Medications:     amLODIPine (Norvasc) 5 MG tablet, Take 1 tablet by mouth Daily., Disp: 90 tablet, Rfl: 1    aspirin 81 MG EC tablet, 1 tablet Daily., Disp: , Rfl:     atorvastatin (Lipitor) 40 MG tablet, Take 1 tablet by mouth Daily.,  "Disp: 90 tablet, Rfl: 3    lisinopril-hydrochlorothiazide (PRINZIDE,ZESTORETIC) 20-25 MG per tablet, Take 1 tablet by mouth Daily., Disp: 90 tablet, Rfl: 1    multivitamin with minerals (MULTIVITAMIN ADULT PO), See Admin Instructions., Disp: , Rfl:     ProAir  (90 Base) MCG/ACT inhaler, Inhale 2 puffs Every 6 (Six) Hours As Needed for Wheezing., Disp: 18 g, Rfl: 0    Allergies:   No Known Allergies    I  CT for Smoker (Age 55-75, 30pk yr): N/A      Depression: PHQ-2 Depression Screening  PHQ-9 Total Score:         Intimate partner violence: (Screen on initial visit, older adult with injury or evidence of neglect):  Violence can be a problem in many people's lives, so I now ask every patient about trauma or abuse they may have experienced in a relationship.  Stress/Safety - Do you feel safe in your relationship?  Afraid/Abused - Have you ever been in a relationship where you were threatened, hurt, or afraid?  Friend/Family - Are your friends aware you have been hurt?  Emergency Plan - Do you have a safe place to go and the resources you need in an emergency?    Osteoporosis:   Men: history of low trauma fracture, androgen deprivation therapy for prostate cancer, hypogonadism, primary hyperparathyroidism, intestinal disorders.     Objective     Physical Exam:  Vital Signs:   Vitals:    01/31/24 1531   BP: 132/84   Pulse: 90   Resp: 18   Temp: 97.4 °F (36.3 °C)   TempSrc: Oral   SpO2: 92%   Weight: (!) 172 kg (379 lb 3.2 oz)   Height: 182.9 cm (72\")     Body mass index is 51.43 kg/m².        Physical Exam  Vitals and nursing note reviewed.   Constitutional:       General: He is not in acute distress.     Appearance: Normal appearance. He is obese.   HENT:      Head: Normocephalic and atraumatic.      Right Ear: Tympanic membrane, ear canal and external ear normal.      Left Ear: Tympanic membrane, ear canal and external ear normal.      Nose: Nose normal.      Mouth/Throat:      Mouth: Mucous membranes are " moist.      Pharynx: Oropharynx is clear.   Eyes:      Extraocular Movements: Extraocular movements intact.      Conjunctiva/sclera: Conjunctivae normal.      Pupils: Pupils are equal, round, and reactive to light.   Neck:      Thyroid: No thyroid mass or thyroid tenderness.   Cardiovascular:      Rate and Rhythm: Normal rate and regular rhythm.      Heart sounds: Normal heart sounds.      Comments: RADIAL PULSES NML  Pulmonary:      Effort: Pulmonary effort is normal.      Breath sounds: Normal breath sounds.   Abdominal:      General: Abdomen is flat. Bowel sounds are normal.      Palpations: Abdomen is soft. There is no mass.      Tenderness: There is no abdominal tenderness. There is no guarding or rebound.      Comments: Small 2 cm umbilical hernia easily reduced   Musculoskeletal:      Cervical back: Normal range of motion and neck supple.      Right lower leg: No edema.      Left lower leg: No edema.   Lymphadenopathy:      Cervical: No cervical adenopathy.   Skin:     General: Skin is warm and dry.      Findings: No rash.      Comments: Multiple tattoos on upper extremities   Neurological:      General: No focal deficit present.      Mental Status: He is alert and oriented to person, place, and time.      Sensory: Sensation is intact.      Motor: Motor function is intact.      Deep Tendon Reflexes: Reflexes normal.      Comments: SYMMETRIC PATELLAR REFLEXES  Cranial nerves 2 through 12 intact   Psychiatric:         Attention and Perception: Attention normal.         Mood and Affect: Mood normal.         Behavior: Behavior normal.         Thought Content: Thought content normal.         Judgment: Judgment normal.         Procedures    Assessment / Plan      Assessment/Plan:   Diagnoses and all orders for this visit:    1. Annual physical exam (Primary)    2. High risk medication use  -     CBC Auto Differential; Future  -     Comprehensive Metabolic Panel; Future  -     CK; Future    3. Hyperglycemia  -      Hemoglobin A1c; Future    4. Mixed hyperlipidemia  -     Lipid Panel; Future    5. Primary hypertension  -     lisinopril-hydrochlorothiazide (PRINZIDE,ZESTORETIC) 20-25 MG per tablet; Take 1 tablet by mouth Daily.  Dispense: 90 tablet; Refill: 1  -     amLODIPine (Norvasc) 5 MG tablet; Take 1 tablet by mouth Daily.  Dispense: 90 tablet; Refill: 1    6. Immunization due  -     Tdap Vaccine Greater Than or Equal To 6yo IM       Annual physical completed    He will come back for fasting blood work next week make sure we go over that    Continue current meds as directed he is doing well    Tdap given today he understands injection site soreness will be most common side effect great to proceed    He declined to do flu shot today    Follow-up on blood work within 1 to 2 weeks    Continue good diet and exercise    He will contact Dr. Pugh for his colonoscopy that he missed back in September.  To get that reset up.    In anticipation of stable to normal blood work we will see him back in 6 months perhaps sooner if worse     For elevated white count if it Persists we will get into see hematology    Follow Up:   Return in about 6 months (around 7/31/2024) for Labs prior next visit, Recheck.    Healthcare Maintenance:   Jacoby Mcmullen voices understanding and acceptance of this advice and will call back with any further questions or concerns. AVS with preventive healthcare tips printed for patient.         Chin Ospina MD  [facility]  Portions of note created with Dragon voice recognition technology

## 2024-01-31 NOTE — PATIENT INSTRUCTIONS

## 2024-04-22 ENCOUNTER — OFFICE VISIT (OUTPATIENT)
Dept: FAMILY MEDICINE CLINIC | Facility: CLINIC | Age: 54
End: 2024-04-22
Payer: MEDICAID

## 2024-04-22 VITALS
SYSTOLIC BLOOD PRESSURE: 134 MMHG | HEART RATE: 90 BPM | HEIGHT: 72 IN | DIASTOLIC BLOOD PRESSURE: 80 MMHG | WEIGHT: 315 LBS | BODY MASS INDEX: 42.66 KG/M2 | RESPIRATION RATE: 15 BRPM | OXYGEN SATURATION: 96 %

## 2024-04-22 DIAGNOSIS — I10 PRIMARY HYPERTENSION: ICD-10-CM

## 2024-04-22 DIAGNOSIS — L72.9 SCROTAL CYST: Primary | ICD-10-CM

## 2024-04-22 PROCEDURE — 3075F SYST BP GE 130 - 139MM HG: CPT | Performed by: FAMILY MEDICINE

## 2024-04-22 PROCEDURE — 1160F RVW MEDS BY RX/DR IN RCRD: CPT | Performed by: FAMILY MEDICINE

## 2024-04-22 PROCEDURE — 99213 OFFICE O/P EST LOW 20 MIN: CPT | Performed by: FAMILY MEDICINE

## 2024-04-22 PROCEDURE — 1159F MED LIST DOCD IN RCRD: CPT | Performed by: FAMILY MEDICINE

## 2024-04-22 PROCEDURE — 3079F DIAST BP 80-89 MM HG: CPT | Performed by: FAMILY MEDICINE

## 2024-04-22 NOTE — PROGRESS NOTES
Follow Up Office Visit      Patient Name: Jacoby Mcmullen  : 1970   MRN: [id number]     Chief Complaint:    Chief Complaint   Patient presents with    Testicle Pain     Patient has a cyst on his right testicle        History of Present Illness: Jacoby Mcmullen is a 54 y.o. male who is here today to   evaluate his cyst he has on his right scrotum.  He says it is gone down but he just wants to make sure that it is all clear it is somewhat hard for him to view completely otherwise no pain with his testicles is not really tender.    He has not yet has blood work done he is going to do that in July and did not do his colonoscopy yet he is going to get that rescheduled Dr. Pugh later.    Subjective      Review of Systems:   Review of Systems    Past Medical History:   Past Medical History:   Diagnosis Date    Essential (primary) hypertension     Forearm fracture     Open reduction internal fixation    Obesity        Past Surgical History:   Past Surgical History:   Procedure Laterality Date    ORIF FOREARM FRACTURE         Family History:   Family History   Problem Relation Age of Onset    Peripheral vascular disease Mother     Obesity Mother     Depression Mother     Hyperlipidemia Mother     Hypertension Mother     Breast cancer Mother     Alcohol abuse Father     Hyperlipidemia Father     Hypertension Father     Heart attack Father        Social History:   Social History     Socioeconomic History    Marital status:    Tobacco Use    Smoking status: Never    Smokeless tobacco: Never   Vaping Use    Vaping status: Never Used   Substance and Sexual Activity    Alcohol use: Yes     Alcohol/week: 1.0 standard drink of alcohol     Types: 1 Shots of liquor per week    Drug use: Never    Sexual activity: Yes     Partners: Female       Medications:     Current Outpatient Medications:     amLODIPine (Norvasc) 5 MG tablet, Take 1 tablet by mouth Daily., Disp: 90 tablet, Rfl: 1    aspirin 81 MG EC tablet,  "1 tablet Daily., Disp: , Rfl:     atorvastatin (Lipitor) 40 MG tablet, Take 1 tablet by mouth Daily., Disp: 90 tablet, Rfl: 3    lisinopril-hydrochlorothiazide (PRINZIDE,ZESTORETIC) 20-25 MG per tablet, Take 1 tablet by mouth Daily., Disp: 90 tablet, Rfl: 1    multivitamin with minerals (MULTIVITAMIN ADULT PO), See Admin Instructions., Disp: , Rfl:     ProAir  (90 Base) MCG/ACT inhaler, Inhale 2 puffs Every 6 (Six) Hours As Needed for Wheezing., Disp: 18 g, Rfl: 0    Allergies:   No Known Allergies    Objective     Physical Exam:  Vital Signs:   Vitals:    04/22/24 0821   BP: 134/80   BP Location: Right arm   Patient Position: Sitting   Cuff Size: Adult   Pulse: 90   Resp: 15   SpO2: 96%   Weight: (!) 168 kg (370 lb)   Height: 182.9 cm (72\")     Facility age limit for growth %curly is 20 years.  Body mass index is 50.18 kg/m².     Physical Exam  Constitutional:       Appearance: Normal appearance.   Pulmonary:      Effort: Pulmonary effort is normal.   Genitourinary:     Testes: Normal.      Comments: No hernia examination of the scrotum I can see where he had that little cyst there is basically just a pore there remaining and it has healed up nicely    I just asked him to keep an eye on the spot if it returns or any other lumps or bumps he will let me know immediately    Testicular exam is normal  Skin:     Comments: Multiple tattoos   Neurological:      Mental Status: He is alert.   Psychiatric:         Mood and Affect: Mood normal.         Behavior: Behavior normal.         Procedures    PHQ-9 Total Score: 0     Assessment / Plan      Assessment/Plan:   Diagnoses and all orders for this visit:    1. Scrotal cyst (Primary)    2. Primary hypertension         Continue careful observation this is resolved on its own if he has any problems or returning of any masses or problems he will let me know otherwise we will see him in July for repeat blood work    Encouraged him to get his colonoscopy with Dr. Pugh " as directed         Follow Up:   Return if symptoms worsen or fail to improve.        Chin Ospina MD  [facility]   Portions of note created with Dragon voice recognition technology

## 2024-08-18 DIAGNOSIS — I10 PRIMARY HYPERTENSION: ICD-10-CM

## 2024-08-19 ENCOUNTER — TELEPHONE (OUTPATIENT)
Dept: FAMILY MEDICINE CLINIC | Facility: CLINIC | Age: 54
End: 2024-08-19

## 2024-08-19 RX ORDER — LISINOPRIL AND HYDROCHLOROTHIAZIDE 25; 20 MG/1; MG/1
1 TABLET ORAL DAILY
Qty: 30 TABLET | Refills: 0 | Status: SHIPPED | OUTPATIENT
Start: 2024-08-19

## 2024-08-19 NOTE — TELEPHONE ENCOUNTER
30 days of patients medication was sent into patients pharmacy. Patient will need appointment and labs for more refills.

## 2024-08-20 ENCOUNTER — TELEPHONE (OUTPATIENT)
Dept: FAMILY MEDICINE CLINIC | Facility: CLINIC | Age: 54
End: 2024-08-20

## 2024-08-20 NOTE — TELEPHONE ENCOUNTER
HUB TO RELAY       PROVIDER SENT IN 30 DAYS OF MEDS BUT WILL NEED AN APPT BEFORE ANYMORE CAN BE SENT IN. PLEASE ADVISE AND SCHEDULE.

## 2024-08-28 DIAGNOSIS — I10 PRIMARY HYPERTENSION: ICD-10-CM

## 2024-08-28 RX ORDER — AMLODIPINE BESYLATE 5 MG/1
5 TABLET ORAL DAILY
Qty: 90 TABLET | Refills: 0 | Status: SHIPPED | OUTPATIENT
Start: 2024-08-28

## 2024-08-28 NOTE — TELEPHONE ENCOUNTER
Rx Refill Note    Requested Prescriptions     Pending Prescriptions Disp Refills    amLODIPine (NORVASC) 5 MG tablet [Pharmacy Med Name: AMLODIPINE BESYLATE 5 MG TAB] 90 tablet 0     Sig: TAKE 1 TABLET BY MOUTH DAILY        Last office visit with prescribing clinician: 4/22/2024      Next office visit with prescribing clinician: Visit date not found   Last labs:   Last refill: 01/31/2024   Pharmacy (be sure to add in Epic). correct

## 2024-09-04 ENCOUNTER — TELEPHONE (OUTPATIENT)
Dept: FAMILY MEDICINE CLINIC | Facility: CLINIC | Age: 54
End: 2024-09-04

## 2024-09-04 NOTE — TELEPHONE ENCOUNTER
HUB TO RELAY    LEFT PT A . PTS RX REQUEST WAS APPROVE, HOWEVER PT WILL NEED AN APPT WITH LABS AT DR. SEVERINO FIRST AVAILABLE BEFORE ANYMORE REFILLS WILL BE GIVEN

## 2024-09-16 DIAGNOSIS — I10 PRIMARY HYPERTENSION: ICD-10-CM

## 2024-09-16 RX ORDER — LISINOPRIL AND HYDROCHLOROTHIAZIDE 20; 25 MG/1; MG/1
1 TABLET ORAL DAILY
Qty: 30 TABLET | Refills: 0 | Status: SHIPPED | OUTPATIENT
Start: 2024-09-16

## 2024-09-17 ENCOUNTER — TELEPHONE (OUTPATIENT)
Dept: FAMILY MEDICINE CLINIC | Facility: CLINIC | Age: 54
End: 2024-09-17

## 2024-10-14 DIAGNOSIS — I10 PRIMARY HYPERTENSION: ICD-10-CM

## 2024-10-14 RX ORDER — LISINOPRIL AND HYDROCHLOROTHIAZIDE 20; 25 MG/1; MG/1
1 TABLET ORAL DAILY
Qty: 30 TABLET | Refills: 0 | Status: SHIPPED | OUTPATIENT
Start: 2024-10-14

## 2024-11-12 DIAGNOSIS — I10 PRIMARY HYPERTENSION: ICD-10-CM

## 2024-11-12 RX ORDER — LISINOPRIL AND HYDROCHLOROTHIAZIDE 20; 25 MG/1; MG/1
1 TABLET ORAL DAILY
Qty: 30 TABLET | Refills: 0 | Status: SHIPPED | OUTPATIENT
Start: 2024-11-12

## 2024-11-12 NOTE — TELEPHONE ENCOUNTER
Rx Refill Note    Requested Prescriptions     Pending Prescriptions Disp Refills    lisinopril-hydrochlorothiazide (PRINZIDE,ZESTORETIC) 20-25 MG per tablet [Pharmacy Med Name: LISINOPRIL-HCTZ 20-25 MG TAB] 30 tablet 0     Sig: TAKE 1 TABLET BY MOUTH DAILY        Last office visit with prescribing clinician: 4/22/2024      Next office visit with prescribing clinician: Visit date not found   Last labs:   Last refill: 10/14/2024   Pharmacy (be sure to add in Epic). correct

## 2024-11-29 DIAGNOSIS — I10 PRIMARY HYPERTENSION: ICD-10-CM

## 2024-12-02 RX ORDER — AMLODIPINE BESYLATE 5 MG/1
5 TABLET ORAL DAILY
Qty: 30 TABLET | Refills: 0 | Status: SHIPPED | OUTPATIENT
Start: 2024-12-02

## 2024-12-02 NOTE — TELEPHONE ENCOUNTER
Rx Refill Note    Requested Prescriptions     Pending Prescriptions Disp Refills    amLODIPine (NORVASC) 5 MG tablet [Pharmacy Med Name: amLODIPine BESYLATE 5 MG TAB] 90 tablet 0     Sig: TAKE 1 TABLET BY MOUTH DAILY        Last office visit with prescribing clinician: 4/22/2024      Next office visit with prescribing clinician: Visit date not found   Last labs:   Last refill: 08/28/2024   Pharmacy (be sure to add in Epic). correct

## 2024-12-05 ENCOUNTER — TELEPHONE (OUTPATIENT)
Dept: FAMILY MEDICINE CLINIC | Facility: CLINIC | Age: 54
End: 2024-12-05

## 2024-12-05 NOTE — TELEPHONE ENCOUNTER
HUB TO RELAY       PROVIDER SENT IN 30 DAYS OF MEDS, HE IS DUE FOR AN APPT AND LABS BEFORE ANYMORE CAN BE SENT IN. PLEASE ADVISE AND SCHEDULE.

## 2024-12-13 DIAGNOSIS — I10 PRIMARY HYPERTENSION: ICD-10-CM

## 2024-12-13 RX ORDER — LISINOPRIL AND HYDROCHLOROTHIAZIDE 20; 25 MG/1; MG/1
1 TABLET ORAL DAILY
Qty: 15 TABLET | Refills: 0 | Status: SHIPPED | OUTPATIENT
Start: 2024-12-13

## 2024-12-16 ENCOUNTER — TELEPHONE (OUTPATIENT)
Dept: FAMILY MEDICINE CLINIC | Facility: CLINIC | Age: 54
End: 2024-12-16

## 2024-12-16 NOTE — TELEPHONE ENCOUNTER
HUB TO RELAY     PROVIDER SENT IN 30 DAYS WORTH OF MEDS. HE WILL NEED AN APPT AND LABS BEFORE ANYMORE REFILLS CAN BE SENT IN. PLEASE ADVISE AND SCHEDULE.

## 2024-12-31 DIAGNOSIS — I10 PRIMARY HYPERTENSION: ICD-10-CM

## 2024-12-31 RX ORDER — LISINOPRIL AND HYDROCHLOROTHIAZIDE 20; 25 MG/1; MG/1
1 TABLET ORAL DAILY
Qty: 15 TABLET | Refills: 0 | Status: SHIPPED | OUTPATIENT
Start: 2024-12-31

## 2024-12-31 RX ORDER — AMLODIPINE BESYLATE 5 MG/1
5 TABLET ORAL DAILY
Qty: 30 TABLET | Refills: 0 | Status: SHIPPED | OUTPATIENT
Start: 2024-12-31

## 2025-01-02 ENCOUNTER — TELEPHONE (OUTPATIENT)
Dept: FAMILY MEDICINE CLINIC | Facility: CLINIC | Age: 55
End: 2025-01-02

## 2025-01-02 NOTE — TELEPHONE ENCOUNTER
HUB TO RELAY     PROVIDER SENT IN 30 DAYS OF MEDS. THEY WILL NEED AN APPT BEFORE THIS RUNS OUT FOR ANYMORE REFILLS. PLEASE ADVISE AND SCHEDULE.  PT IS ALSO DUE FOR LABS.

## 2025-01-18 DIAGNOSIS — I10 PRIMARY HYPERTENSION: ICD-10-CM

## 2025-01-20 RX ORDER — LISINOPRIL AND HYDROCHLOROTHIAZIDE 20; 25 MG/1; MG/1
1 TABLET ORAL DAILY
Qty: 15 TABLET | Refills: 0 | Status: SHIPPED | OUTPATIENT
Start: 2025-01-20

## 2025-01-21 ENCOUNTER — TELEPHONE (OUTPATIENT)
Dept: FAMILY MEDICINE CLINIC | Facility: CLINIC | Age: 55
End: 2025-01-21

## 2025-01-21 NOTE — TELEPHONE ENCOUNTER
HUB TO RELAY    CALLED AND LEFT VM. DR. ZULETA IS NEEDING TO SEE PT IN OFFICE FOR AN APPT AT HIS NEXT AVAILABLE BEFORE HE IS ABLE TO REFILL MEDICATIONS ANYMORE

## 2025-01-30 DIAGNOSIS — I10 PRIMARY HYPERTENSION: ICD-10-CM

## 2025-01-30 RX ORDER — AMLODIPINE BESYLATE 5 MG/1
5 TABLET ORAL DAILY
Qty: 14 TABLET | Refills: 0 | Status: SHIPPED | OUTPATIENT
Start: 2025-01-30

## 2025-01-30 NOTE — TELEPHONE ENCOUNTER
Rx Refill Note    Requested Prescriptions     Pending Prescriptions Disp Refills    amLODIPine (NORVASC) 5 MG tablet [Pharmacy Med Name: amLODIPine BESYLATE 5 MG TAB] 30 tablet 0     Sig: TAKE 1 TABLET BY MOUTH DAILY        Last office visit with prescribing clinician: 4/22/2024      Next office visit with prescribing clinician: Visit date not found   Last labs:   Last refill: 12/31/2024   Pharmacy (be sure to add in Epic). correct

## 2025-02-08 DIAGNOSIS — I10 PRIMARY HYPERTENSION: ICD-10-CM

## 2025-02-10 RX ORDER — AMLODIPINE BESYLATE 5 MG/1
5 TABLET ORAL DAILY
Qty: 14 TABLET | Refills: 0 | OUTPATIENT
Start: 2025-02-10

## 2025-02-10 RX ORDER — LISINOPRIL AND HYDROCHLOROTHIAZIDE 20; 25 MG/1; MG/1
1 TABLET ORAL DAILY
Qty: 15 TABLET | Refills: 0 | OUTPATIENT
Start: 2025-02-10